# Patient Record
Sex: FEMALE | Race: BLACK OR AFRICAN AMERICAN | NOT HISPANIC OR LATINO | Employment: FULL TIME | URBAN - METROPOLITAN AREA
[De-identification: names, ages, dates, MRNs, and addresses within clinical notes are randomized per-mention and may not be internally consistent; named-entity substitution may affect disease eponyms.]

---

## 2023-07-14 ENCOUNTER — HOSPITAL ENCOUNTER (EMERGENCY)
Facility: HOSPITAL | Age: 28
Discharge: HOME | End: 2023-07-14
Attending: EMERGENCY MEDICINE
Payer: MEDICAID

## 2023-07-14 VITALS
DIASTOLIC BLOOD PRESSURE: 74 MMHG | TEMPERATURE: 98 F | OXYGEN SATURATION: 96 % | HEART RATE: 71 BPM | HEIGHT: 64 IN | BODY MASS INDEX: 44.05 KG/M2 | WEIGHT: 258 LBS | SYSTOLIC BLOOD PRESSURE: 108 MMHG | RESPIRATION RATE: 16 BRPM

## 2023-07-14 DIAGNOSIS — S61.219A LACERATION OF FINGER OF RIGHT HAND WITHOUT FOREIGN BODY WITHOUT DAMAGE TO NAIL, UNSPECIFIED FINGER, INITIAL ENCOUNTER: Primary | ICD-10-CM

## 2023-07-14 PROCEDURE — 12001 RPR S/N/AX/GEN/TRNK 2.5CM/<: CPT | Mod: ER

## 2023-07-14 PROCEDURE — 99283 EMERGENCY DEPT VISIT LOW MDM: CPT | Mod: 25,ER

## 2023-07-14 RX ORDER — CEPHALEXIN 500 MG/1
500 CAPSULE ORAL 4 TIMES DAILY
Qty: 20 CAPSULE | Refills: 0 | Status: SHIPPED | OUTPATIENT
Start: 2023-07-14 | End: 2023-07-19

## 2023-07-14 NOTE — ED PROVIDER NOTES
"Encounter Date: 7/14/2023       History     Chief Complaint   Patient presents with    Laceration     Presents with laceration to 3rd digit of R hand after cutting on salad chopper this morning. Bleeding controlled. Tetanus UTD.     Patient is a 28-year-old woman presenting to the emergency department for evaluation after sustaining lacerations to her right hand while at work.  She states that she was using a "salad chopper" when part of the chopper fell onto her hand causing wounds to the finger tips.  She states she immediately applied pressure then came to the emergency department.  She states her last tetanus shot was less than 5 years ago.  She denies difficulty with extension or flexion of the fingers.  She denies numbness or tingling.    Review of patient's allergies indicates:  No Known Allergies  History reviewed. No pertinent past medical history.  History reviewed. No pertinent surgical history.  History reviewed. No pertinent family history.  Social History     Tobacco Use    Smoking status: Never    Smokeless tobacco: Never   Substance Use Topics    Alcohol use: Never    Drug use: Never     Review of Systems   Constitutional:  Negative for activity change.   Skin:  Positive for wound.   Neurological:  Negative for weakness and numbness.   Hematological:  Does not bruise/bleed easily.     Physical Exam     Initial Vitals [07/14/23 0928]   BP Pulse Resp Temp SpO2   108/74 71 16 98.1 °F (36.7 °C) 96 %      MAP       --         Physical Exam    Nursing note and vitals reviewed.  Constitutional: She appears well-developed and well-nourished. No distress.   HENT:   Head: Normocephalic.   Eyes: EOM are normal.   Cardiovascular:  Normal rate.           Pulmonary/Chest: No respiratory distress.     Neurological: No sensory deficit.   Skin: Laceration noted.       ED Course   Lac Repair    Date/Time: 7/14/2023 9:47 AM  Performed by: lAeshia Gillespie MD  Authorized by: Aleshia Gillespie MD     Consent:     Consent " obtained:  Verbal    Consent given by:  Patient    Risks, benefits, and alternatives were discussed: yes      Risks discussed:  Infection    Alternatives discussed:  No treatment and observation  Universal protocol:     Procedure explained and questions answered to patient or proxy's satisfaction: yes      Patient identity confirmed:  Verbally with patient and hospital-assigned identification number  Anesthesia:     Anesthesia method:  None  Laceration details:     Location:  Finger    Finger location:  R index finger (right index, long, and ring fingers)    Length (cm):  1    Depth (mm):  1  Pre-procedure details:     Preparation:  Patient was prepped and draped in usual sterile fashion  Exploration:     Limited defect created (wound extended): no      Imaging outcome: foreign body not noted      Wound exploration: wound explored through full range of motion and entire depth of wound visualized      Wound extent: no fascia violation noted, no foreign bodies/material noted, no nerve damage noted and no tendon damage noted      Contaminated: no    Treatment:     Area cleansed with:  Povidone-iodine    Amount of cleaning:  Standard    Irrigation solution: hydrogen peroxide solution.    Visualized foreign bodies/material removed: no      Debridement:  None    Undermining:  None  Skin repair:     Repair method:  Tissue adhesive  Approximation:     Approximation:  Close  Post-procedure details:     Dressing:  Open (no dressing)    Procedure completion:  Tolerated well, no immediate complications  Labs Reviewed - No data to display         Imaging Results    None          Medications - No data to display  Medical Decision Making:   Initial Assessment:   This is an urgent evaluation of a 28-year-old woman presenting to the emergency department today for evaluation of lacerations to her palmar right hand.  Differential Diagnosis:   Laceration, retained foreign body, nerve injury, tendon injury  ED Management:  On physical  examination, patient was in no acute distress.  Examination of her right hand revealed very superficial lacerations to the 2nd, 3rd, and 4th digits.  These were cleaned and examined in a clean, dry, bloodless field.  No foreign bodies were visualized.  No fat was extruding from the wounds.  These were were hemostatic.  These were repaired using Dermabond.  Patient was advised to keep her hand clean and dry and to avoid work and tell her lacerations have healed.  She was placed on Keflex given this is a hand wound.  She was advised to be vigilant for any signs of infection and will follow-up outpatient with primary care as needed.  Return precautions given.    Aleshia Gillespie MD  9:47 AM  7/14/2023                           Clinical Impression:   Final diagnoses:  [S61.219A] Laceration of finger of right hand without foreign body without damage to nail, unspecified finger, initial encounter (Primary)        ED Disposition Condition    Discharge Stable          ED Prescriptions       Medication Sig Dispense Start Date End Date Auth. Provider    cephALEXin (KEFLEX) 500 MG capsule Take 1 capsule (500 mg total) by mouth 4 (four) times daily. for 5 days 20 capsule 7/14/2023 7/19/2023 Aleshia Gillespie MD          Follow-up Information       Follow up With Specialties Details Why Contact Info    [facility]  Schedule an appointment as soon as possible for a visit  For wound re-check, As needed [address] [place] [address]  [phone]               Aleshia Gillespie MD  07/14/23 0930

## 2023-07-14 NOTE — Clinical Note
"Rodriguez Villanuevaklever Ibrahim was seen and treated in our emergency department on 7/14/2023.  She may return to work on 07/21/2023.       If you have any questions or concerns, please don't hesitate to call.      Aleshia Gillespie MD"